# Patient Record
Sex: FEMALE | Race: WHITE | NOT HISPANIC OR LATINO | Employment: UNEMPLOYED | URBAN - METROPOLITAN AREA
[De-identification: names, ages, dates, MRNs, and addresses within clinical notes are randomized per-mention and may not be internally consistent; named-entity substitution may affect disease eponyms.]

---

## 2018-02-01 ENCOUNTER — HOSPITAL ENCOUNTER (OUTPATIENT)
Dept: OTHER | Facility: HOSPITAL | Age: 63
Setting detail: SPECIMEN
Discharge: HOME | End: 2018-02-01
Attending: THORACIC SURGERY (CARDIOTHORACIC VASCULAR SURGERY) | Admitting: THORACIC SURGERY (CARDIOTHORACIC VASCULAR SURGERY)

## 2019-06-21 ENCOUNTER — APPOINTMENT (OUTPATIENT)
Dept: GENERAL RADIOLOGY | Facility: HOSPITAL | Age: 64
End: 2019-06-21

## 2019-06-21 ENCOUNTER — HOSPITAL ENCOUNTER (EMERGENCY)
Facility: HOSPITAL | Age: 64
Discharge: HOME | End: 2019-06-21
Admitting: EMERGENCY MEDICINE

## 2019-06-21 VITALS
WEIGHT: 197 LBS | BODY MASS INDEX: 31.66 KG/M2 | HEIGHT: 66 IN | TEMPERATURE: 98.4 F | HEART RATE: 55 BPM | SYSTOLIC BLOOD PRESSURE: 187 MMHG | DIASTOLIC BLOOD PRESSURE: 81 MMHG | RESPIRATION RATE: 18 BRPM | OXYGEN SATURATION: 97 %

## 2019-06-21 DIAGNOSIS — S93.402A SPRAIN OF LEFT ANKLE, UNSPECIFIED LIGAMENT, INITIAL ENCOUNTER: ICD-10-CM

## 2019-06-21 DIAGNOSIS — S39.012A LUMBAR STRAIN, INITIAL ENCOUNTER: Primary | ICD-10-CM

## 2019-06-21 PROCEDURE — 72110 X-RAY EXAM L-2 SPINE 4/>VWS: CPT

## 2019-06-21 PROCEDURE — 73610 X-RAY EXAM OF ANKLE: CPT

## 2019-06-21 PROCEDURE — 25010000002 MORPHINE PER 10 MG: Performed by: EMERGENCY MEDICINE

## 2019-06-21 PROCEDURE — 99283 EMERGENCY DEPT VISIT LOW MDM: CPT

## 2019-06-21 PROCEDURE — 96372 THER/PROPH/DIAG INJ SC/IM: CPT

## 2019-06-21 RX ORDER — GABAPENTIN 800 MG/1
TABLET ORAL EVERY 8 HOURS SCHEDULED
COMMUNITY
End: 2021-09-14

## 2019-06-21 RX ORDER — POTASSIUM CHLORIDE 750 MG/1
TABLET, EXTENDED RELEASE ORAL
COMMUNITY
End: 2019-10-31 | Stop reason: ALTCHOICE

## 2019-06-21 RX ORDER — HYDRALAZINE HYDROCHLORIDE 100 MG/1
TABLET, FILM COATED ORAL EVERY 12 HOURS SCHEDULED
COMMUNITY

## 2019-06-21 RX ORDER — FUROSEMIDE 40 MG/1
TABLET ORAL
COMMUNITY
End: 2019-10-31 | Stop reason: ALTCHOICE

## 2019-06-21 RX ORDER — OXYCODONE HYDROCHLORIDE AND ACETAMINOPHEN 5; 325 MG/1; MG/1
TABLET ORAL EVERY 8 HOURS SCHEDULED
COMMUNITY
End: 2019-10-31 | Stop reason: ALTCHOICE

## 2019-06-21 RX ORDER — MORPHINE SULFATE 4 MG/ML
4 INJECTION, SOLUTION INTRAMUSCULAR; INTRAVENOUS ONCE
Status: COMPLETED | OUTPATIENT
Start: 2019-06-21 | End: 2019-06-21

## 2019-06-21 RX ORDER — OLANZAPINE 2.5 MG/1
TABLET ORAL
COMMUNITY

## 2019-06-21 RX ORDER — ALBUTEROL SULFATE 90 UG/1
AEROSOL, METERED RESPIRATORY (INHALATION)
COMMUNITY

## 2019-06-21 RX ORDER — PANTOPRAZOLE SODIUM 40 MG/1
TABLET, DELAYED RELEASE ORAL
COMMUNITY

## 2019-06-21 RX ORDER — HYDROCODONE BITARTRATE AND ACETAMINOPHEN 5; 325 MG/1; MG/1
1 TABLET ORAL EVERY 6 HOURS PRN
Qty: 10 TABLET | Refills: 0 | Status: SHIPPED | OUTPATIENT
Start: 2019-06-21 | End: 2019-10-31 | Stop reason: ALTCHOICE

## 2019-06-21 RX ORDER — DIPHENOXYLATE HYDROCHLORIDE AND ATROPINE SULFATE 2.5; .025 MG/1; MG/1
TABLET ORAL
COMMUNITY

## 2019-06-21 RX ORDER — SIMVASTATIN 20 MG
TABLET ORAL
COMMUNITY
Start: 2015-07-06

## 2019-06-21 RX ORDER — DICYCLOMINE HCL 20 MG
TABLET ORAL
COMMUNITY
Start: 2015-07-06 | End: 2019-10-31 | Stop reason: ALTCHOICE

## 2019-06-21 RX ORDER — ONDANSETRON 4 MG/1
4 TABLET, ORALLY DISINTEGRATING ORAL ONCE
Status: COMPLETED | OUTPATIENT
Start: 2019-06-21 | End: 2019-06-21

## 2019-06-21 RX ORDER — IBUPROFEN 800 MG/1
TABLET ORAL
COMMUNITY
End: 2021-08-02 | Stop reason: ALTCHOICE

## 2019-06-21 RX ORDER — BACLOFEN 10 MG/1
TABLET ORAL EVERY 8 HOURS SCHEDULED
COMMUNITY
End: 2019-10-31 | Stop reason: ALTCHOICE

## 2019-06-21 RX ORDER — ONDANSETRON HYDROCHLORIDE 8 MG/1
TABLET, FILM COATED ORAL
COMMUNITY
End: 2021-08-02 | Stop reason: ALTCHOICE

## 2019-06-21 RX ADMIN — MORPHINE SULFATE 4 MG: 4 INJECTION INTRAVENOUS at 11:13

## 2019-06-21 RX ADMIN — ONDANSETRON 4 MG: 4 TABLET, ORALLY DISINTEGRATING ORAL at 11:12

## 2019-10-31 ENCOUNTER — OFFICE VISIT (OUTPATIENT)
Dept: PAIN MEDICINE | Facility: CLINIC | Age: 64
End: 2019-10-31

## 2019-10-31 VITALS
OXYGEN SATURATION: 99 % | HEIGHT: 66 IN | WEIGHT: 190 LBS | BODY MASS INDEX: 30.53 KG/M2 | SYSTOLIC BLOOD PRESSURE: 132 MMHG | HEART RATE: 52 BPM | RESPIRATION RATE: 16 BRPM | TEMPERATURE: 97.6 F | DIASTOLIC BLOOD PRESSURE: 81 MMHG

## 2019-10-31 DIAGNOSIS — Z79.899 HIGH RISK MEDICATION USE: ICD-10-CM

## 2019-10-31 DIAGNOSIS — M47.812 CERVICAL SPONDYLOSIS WITHOUT MYELOPATHY: ICD-10-CM

## 2019-10-31 DIAGNOSIS — G89.4 CHRONIC PAIN SYNDROME: Primary | ICD-10-CM

## 2019-10-31 DIAGNOSIS — M47.817 LUMBOSACRAL SPONDYLOSIS WITHOUT MYELOPATHY: ICD-10-CM

## 2019-10-31 DIAGNOSIS — M79.7 FIBROMYALGIA: ICD-10-CM

## 2019-10-31 PROCEDURE — G0463 HOSPITAL OUTPT CLINIC VISIT: HCPCS | Performed by: ANESTHESIOLOGY

## 2019-10-31 PROCEDURE — 99204 OFFICE O/P NEW MOD 45 MIN: CPT | Performed by: ANESTHESIOLOGY

## 2019-10-31 RX ORDER — AZELASTINE HYDROCHLORIDE 137 UG/1
SPRAY, METERED NASAL
COMMUNITY

## 2019-10-31 RX ORDER — VARENICLINE TARTRATE 1 MG/1
1 TABLET, FILM COATED ORAL 2 TIMES DAILY
COMMUNITY

## 2019-10-31 RX ORDER — FERROUS SULFATE 325(65) MG
325 TABLET ORAL
COMMUNITY

## 2019-10-31 RX ORDER — ALLOPURINOL 300 MG/1
300 TABLET ORAL DAILY
COMMUNITY

## 2019-10-31 RX ORDER — CLOPIDOGREL BISULFATE 75 MG/1
75 TABLET ORAL DAILY
COMMUNITY
End: 2021-08-02 | Stop reason: ALTCHOICE

## 2019-10-31 RX ORDER — AMLODIPINE BESYLATE 5 MG/1
5 TABLET ORAL DAILY
COMMUNITY

## 2019-10-31 RX ORDER — LANOLIN ALCOHOL/MO/W.PET/CERES
1000 CREAM (GRAM) TOPICAL DAILY
COMMUNITY
End: 2022-03-14

## 2019-10-31 RX ORDER — ERGOCALCIFEROL 1.25 MG/1
50000 CAPSULE ORAL WEEKLY
COMMUNITY
End: 2022-03-14

## 2019-10-31 NOTE — PROGRESS NOTES
Subjective    CC Neck pain,Back pain  Shavonne Falcon is a 64 y.o. female with chronic polyarthralgia, generalized myofascial pain from fibromyalgia, back pain neck pain from DDD spondylosis here for initial evaluation.  Referred by PCP.  complains of continued chronic generalized myofascial pain and polyarthralgia worse with any activity.  Chronic back pain neck pain without radicular pain.  On chronic opioid therapy with oxycodone, seen at outside pain clinic/.  Was dismissed Due to failed urine drug screen and not showing up for pill count.    C-spine CT 2018 no acute findings, multiple level spondylosis.      Pain Assessment   Location of Pain: Lower Back, R Hip, L Hip, L Leg, neck pain, joint  Description of Pain: Dull/Aching, Throbbing, Stabbing  Previous Pain Rating :  Current Pain Rating:   Aggravating Factors: Activity  Alleviating Factors: Rest, Medication    The following portions of the patient's history were reviewed and updated as appropriate: allergies, current medications, past family history, past medical history, past social history, past surgical history and problem list.      has a past medical history of Anxiety, Arthritis, Asthma, COPD (chronic obstructive pulmonary disease) (CMS/[facility]), Depression, Fibromyalgia, primary, GERD (gastroesophageal reflux disease), Hyperlipidemia, Hypertension, Hypertension, Nerve damage, Neuropathy, and Sleep apnea.     . has a past surgical history that includes Brain surgery; Hysterectomy; Tonsillectomy; Cholecystectomy; Colonoscopy; Esophagogastroduodenoscopy; and Lung surgery.    family history includes Alcohol abuse in her brother, daughter, father, and maternal grandmother; Cancer in her father and mother; Heart attack in her mother; Hypertension in her mother.    Social History     Tobacco Use   • Smoking status: Current Every Day Smoker     Packs/day: 1.00     Types: Cigarettes   • Smokeless tobacco: Never Used   • Tobacco comment: on chantix   Substance  Use Topics   • Alcohol use: No     Frequency: Never     Review of Systems   Constitutional: Negative for chills, fatigue and fever.   HENT: Negative for swollen glands and trouble swallowing.    Respiratory: Negative.  Negative for shortness of breath.    Cardiovascular: Negative for chest pain, palpitations and leg swelling.   Gastrointestinal: Negative for nausea and vomiting.   Musculoskeletal: Positive for arthralgias, back pain and myalgias. Negative for gait problem, joint swelling, neck pain and neck stiffness.   Skin: Negative for color change, dry skin, rash and skin lesions.   Neurological: Negative for dizziness, weakness, light-headedness and headache.   Hematological: Negative for adenopathy. Does not bruise/bleed easily.   Psychiatric/Behavioral: Negative for behavioral problems, suicidal ideas and depressed mood.   All other systems reviewed and are negative.      Objective   Physical Exam   Constitutional: She is oriented to person, place, and time. She appears well-developed and well-nourished. No distress.   Eyes: Conjunctivae are normal. Pupils are equal, round, and reactive to light.   Neck: Normal range of motion and full passive range of motion without pain. No Kernig's sign noted.   Cardiovascular: Normal heart sounds.   Pulmonary/Chest: Effort normal and breath sounds normal.   Musculoskeletal:        Lumbar back: She exhibits decreased range of motion, tenderness and pain.     Vascular Status -  Her right foot exhibits no edema. Her left foot exhibits no edema.  Lymphadenopathy:     She has no cervical adenopathy.   Neurological: She is alert and oriented to person, place, and time. She has normal strength and normal reflexes. No sensory deficit. Coordination normal.   Skin: Skin is warm and dry. Capillary refill takes less than 2 seconds.   Psychiatric: She has a normal mood and affect. Her behavior is normal.   Vitals reviewed.    /81   Pulse 52   Temp 97.6 °F (36.4 °C)   Resp 16   " Ht 167.6 cm (66\")   Wt 86.2 kg (190 lb)   SpO2 99%   BMI 30.67 kg/m²      Global pain scale and PHQ 9 on chart  Opioid risk tool low risk    Assessment/Plan   Shavonne was seen today for back pain and initial evaluation.    Diagnoses and all orders for this visit:    Chronic pain syndrome    Lumbosacral spondylosis without myelopathy    Cervical spondylosis without myelopathy    Fibromyalgia    High risk medication use    Summary  Shavonne Falcon is a 64 y.o. female with chronic polyarthralgia, generalized myofascial pain from fibromyalgia, back pain neck pain from DDD spondylosis here for initial evaluation.  Referred by PCP.  Chronic pain from lumbar and cervical DDD spondylosis, fibromyalgia and polyarthralgia.  On chronic opioid therapy for several years.  Was seen in pain management with Dr. ALCANTAR.  Has been dismissed for failed UDS and not showing up for pill counts.    When asked to sign a release for record from Dr. ALCANTAR patient declines and became upset as I stressed the importance of controlled substance/ chronic opioid therapy agreement/policies and the need to review old records prior to determining whether she can continue opioid therapy.      Spent over 10-minute in face-to-face discussion with patient regarding the above.    No further follow up.   F/U with PCP.                  "

## 2020-07-01 ENCOUNTER — APPOINTMENT (OUTPATIENT)
Dept: WOUND CARE | Facility: HOSPITAL | Age: 65
End: 2020-07-01

## 2020-07-08 ENCOUNTER — APPOINTMENT (OUTPATIENT)
Dept: WOUND CARE | Facility: HOSPITAL | Age: 65
End: 2020-07-08

## 2020-07-15 ENCOUNTER — OFFICE VISIT (OUTPATIENT)
Dept: WOUND CARE | Facility: HOSPITAL | Age: 65
End: 2020-07-15

## 2020-07-15 DIAGNOSIS — G90.09 OTHER IDIOPATHIC PERIPHERAL AUTONOMIC NEUROPATHY: ICD-10-CM

## 2020-07-15 PROCEDURE — 99203 OFFICE O/P NEW LOW 30 MIN: CPT | Performed by: PODIATRIST

## 2020-07-15 PROCEDURE — G0463 HOSPITAL OUTPT CLINIC VISIT: HCPCS

## 2020-11-25 ENCOUNTER — INPATIENT HOSPITAL (INPATIENT)
Dept: URBAN - METROPOLITAN AREA HOSPITAL 76 | Facility: HOSPITAL | Age: 65
End: 2020-11-25
Payer: MEDICARE

## 2020-11-25 DIAGNOSIS — A04.72 ENTEROCOLITIS DUE TO CLOSTRIDIUM DIFFICILE, NOT SPECIFIED AS: ICD-10-CM

## 2020-11-25 DIAGNOSIS — L03.90 CELLULITIS, UNSPECIFIED: ICD-10-CM

## 2020-11-25 DIAGNOSIS — K52.9 NONINFECTIVE GASTROENTERITIS AND COLITIS, UNSPECIFIED: ICD-10-CM

## 2020-11-25 PROCEDURE — 99222 1ST HOSP IP/OBS MODERATE 55: CPT | Performed by: INTERNAL MEDICINE

## 2020-11-26 ENCOUNTER — INPATIENT HOSPITAL (INPATIENT)
Dept: URBAN - METROPOLITAN AREA HOSPITAL 76 | Facility: HOSPITAL | Age: 65
End: 2020-11-26
Payer: MEDICARE

## 2020-11-26 DIAGNOSIS — R65.21 SEVERE SEPSIS WITH SEPTIC SHOCK: ICD-10-CM

## 2020-11-26 DIAGNOSIS — L03.90 CELLULITIS, UNSPECIFIED: ICD-10-CM

## 2020-11-26 DIAGNOSIS — A04.72 ENTEROCOLITIS DUE TO CLOSTRIDIUM DIFFICILE, NOT SPECIFIED AS: ICD-10-CM

## 2020-11-26 DIAGNOSIS — Z90.5 ACQUIRED ABSENCE OF KIDNEY: ICD-10-CM

## 2020-11-26 PROCEDURE — 99232 SBSQ HOSP IP/OBS MODERATE 35: CPT | Performed by: INTERNAL MEDICINE

## 2020-11-27 PROCEDURE — 99232 SBSQ HOSP IP/OBS MODERATE 35: CPT | Performed by: INTERNAL MEDICINE

## 2021-08-02 ENCOUNTER — OFFICE VISIT (OUTPATIENT)
Dept: PAIN MEDICINE | Facility: CLINIC | Age: 66
End: 2021-08-02

## 2021-08-02 ENCOUNTER — TELEPHONE (OUTPATIENT)
Dept: PAIN MEDICINE | Facility: CLINIC | Age: 66
End: 2021-08-02

## 2021-08-02 VITALS — BODY MASS INDEX: 30.53 KG/M2 | WEIGHT: 190 LBS | HEIGHT: 66 IN | RESPIRATION RATE: 16 BRPM

## 2021-08-02 DIAGNOSIS — Z79.899 HIGH RISK MEDICATION USE: Primary | ICD-10-CM

## 2021-08-02 DIAGNOSIS — M47.817 LUMBOSACRAL SPONDYLOSIS WITHOUT MYELOPATHY: ICD-10-CM

## 2021-08-02 DIAGNOSIS — M47.812 CERVICAL SPONDYLOSIS WITHOUT MYELOPATHY: ICD-10-CM

## 2021-08-02 DIAGNOSIS — Z79.899 HIGH RISK MEDICATION USE: ICD-10-CM

## 2021-08-02 DIAGNOSIS — G89.4 CHRONIC PAIN SYNDROME: Primary | ICD-10-CM

## 2021-08-02 DIAGNOSIS — M79.7 FIBROMYALGIA: ICD-10-CM

## 2021-08-02 PROCEDURE — 99214 OFFICE O/P EST MOD 30 MIN: CPT | Performed by: ANESTHESIOLOGY

## 2021-08-02 RX ORDER — ONDANSETRON 8 MG/1
TABLET, ORALLY DISINTEGRATING ORAL
COMMUNITY

## 2021-08-02 RX ORDER — OXYCODONE AND ACETAMINOPHEN 7.5; 325 MG/1; MG/1
1 TABLET ORAL 2 TIMES DAILY PRN
Qty: 14 TABLET | Refills: 0 | Status: SHIPPED | OUTPATIENT
Start: 2021-08-02 | End: 2021-09-13 | Stop reason: SDUPTHER

## 2021-08-02 RX ORDER — LEVETIRACETAM 1000 MG/1
TABLET ORAL
COMMUNITY
Start: 2021-07-13

## 2021-08-02 RX ORDER — TICAGRELOR 60 MG/1
TABLET ORAL
COMMUNITY
Start: 2021-07-13

## 2021-08-02 RX ORDER — APIXABAN 5 MG/1
TABLET, FILM COATED ORAL
COMMUNITY
Start: 2021-07-13

## 2021-08-02 RX ORDER — METOPROLOL SUCCINATE 50 MG/1
TABLET, EXTENDED RELEASE ORAL
COMMUNITY
Start: 2021-03-04

## 2021-08-02 RX ORDER — IPRATROPIUM BROMIDE AND ALBUTEROL SULFATE 2.5; .5 MG/3ML; MG/3ML
SOLUTION RESPIRATORY (INHALATION)
COMMUNITY
Start: 2021-07-01

## 2021-08-02 RX ORDER — MONTELUKAST SODIUM 10 MG/1
TABLET ORAL
COMMUNITY
Start: 2021-07-13

## 2021-08-02 RX ORDER — ATORVASTATIN CALCIUM 40 MG/1
TABLET, FILM COATED ORAL
COMMUNITY
Start: 2021-07-13 | End: 2021-08-02 | Stop reason: ALTCHOICE

## 2021-08-02 RX ORDER — GLYCOPYRROLATE AND FORMOTEROL FUMARATE 9; 4.8 UG/1; UG/1
AEROSOL, METERED RESPIRATORY (INHALATION)
COMMUNITY
Start: 2021-07-24

## 2021-08-02 RX ORDER — FOLIC ACID 1 MG/1
TABLET ORAL
COMMUNITY
Start: 2021-07-13

## 2021-08-02 NOTE — TELEPHONE ENCOUNTER
Caller: MANDIE TILLEY    Relationship: SELF    Best call back number: [phone]    Medication needed:NOT CERTAIN WHAT TYPE OF PAIN MEDICATION DR. OLIVER IS PRESCRIBING FOR PATIENT- PATIENT STATES IT WAS TO BE A WEEKS WORTH  Requested Prescriptions      No prescriptions requested or ordered in this encounter       When do you need the refill by: 08/02/2021    What additional details did the patient provide when requesting the medication: PATIENT IS NOT ABLE TO DRIVE- HAS LIMITED ASSISTANCE- HER PHARMACY CLOSES AT 6:00    Does the patient have less than a 3 day supply:  [x] Yes  [] No    What is the patient's preferred pharmacy:[facility]: [id number]: [phone]

## 2021-08-03 RX ORDER — OXYCODONE AND ACETAMINOPHEN 7.5; 325 MG/1; MG/1
1 TABLET ORAL 2 TIMES DAILY PRN
Qty: 46 TABLET | Refills: 0 | Status: SHIPPED | OUTPATIENT
Start: 2021-08-09 | End: 2021-09-13 | Stop reason: SDUPTHER

## 2021-08-03 NOTE — PROGRESS NOTES
Subjective    CC Neck pain,Back pain  Shavonne Falcon is a 66 y.o. female with chronic polyarthralgia, generalized myofascial pain from fibromyalgia, back pain neck pain from DDD spondylosis here for f/u.   Last seen 2 years ago.  In the interim was seen at [place] pain management continued opioid therapy of oxycodone.  Continued chronic generalized myofascial pain and polyarthralgia worse with any activity.  Chronic back pain neck pain without radicular pain.  On chronic opioid therapy with oxycodone, seen at outside pain clinic/.  Was dismissed Due to failed urine drug screen and not showing up for pill count.    C-spine CT 2018 no acute findings, multiple level spondylosis.      Pain Assessment   Location of Pain: Lower Back, legs, neck pain, joint  Description of Pain: Dull/Aching, Throbbing, Stabbing  Previous Pain Rating :8  Current Pain Ratin  Aggravating Factors: Activity  Alleviating Factors: Rest, Medication    The following portions of the patient's history were reviewed and updated as appropriate: allergies, current medications, past family history, past medical history, past social history, past surgical history and problem list.      has a past medical history of Anxiety, Arthritis, Asthma, COPD (chronic obstructive pulmonary disease) (CMS/HCC), Depression, Fibromyalgia, primary, GERD (gastroesophageal reflux disease), Hyperlipidemia, Hypertension, Hypertension, Low back pain, Nerve damage, Neuropathy, and Sleep apnea.     . has a past surgical history that includes Brain surgery; Hysterectomy; Tonsillectomy; Cholecystectomy; Colonoscopy; Esophagogastroduodenoscopy; and Lung surgery.    family history includes Alcohol abuse in her brother, daughter, father, and maternal grandmother; Cancer in her father and mother; Heart attack in her mother; Hypertension in her mother.    Social History     Tobacco Use   • Smoking status: Current Every Day Smoker     Packs/day: 1.00     Types: Cigarettes   •  "Smokeless tobacco: Never Used   • Tobacco comment: on chantix   Substance Use Topics   • Alcohol use: No     Review of Systems   Musculoskeletal: Positive for arthralgias, back pain, gait problem, myalgias, neck pain and neck stiffness.   All other systems reviewed and are negative.      Objective   Physical Exam   Constitutional: No distress.   cane   Pulmonary/Chest: Effort normal.   Musculoskeletal:      Cervical back: She exhibits decreased range of motion and tenderness.      Lumbar back: She exhibits decreased range of motion and tenderness.   Vitals reviewed.    Resp 16   Ht 167.6 cm (66\")   Wt 86.2 kg (190 lb)   BMI 30.67 kg/m²     PHQ 9 on chart  Opioid risk tool low risk    Assessment/Plan   Diagnoses and all orders for this visit:    1. Chronic pain syndrome (Primary)  -     oxyCODONE-acetaminophen (PERCOCET) 7.5-325 MG per tablet; Take 1 tablet by mouth 2 (Two) Times a Day As Needed for Severe Pain .  Dispense: 14 tablet; Refill: 0  -     oxyCODONE-acetaminophen (PERCOCET) 7.5-325 MG per tablet; Take 1 tablet by mouth 2 (Two) Times a Day As Needed for Severe Pain . DNF before 8/9/2021  Dispense: 46 tablet; Refill: 0    2. Lumbosacral spondylosis without myelopathy    3. Cervical spondylosis without myelopathy    4. Fibromyalgia    5. High risk medication use    Summary  Shavonne Falcon is a 64 y.o. female with chronic polyarthralgia, generalized myofascial pain from fibromyalgia, back pain neck pain from DDD spondylosis here for follow-up.  Chronic pain from lumbar and cervical DDD spondylosis, fibromyalgia and polyarthralgia.  History of chronic polyneuropathy/craniotomy.    Last seen 2 years ago.  In the interim was seen at [place] pain management continued opioid therapy of oxycodone.  Past history of noncompliance.  Discussed risk of tolerance, dependence, respiratory depression, coma and death associated with use of oral opioids for treatment of chronic nonmalignant pain.   Discussed controlled " substance prescribing policy and compliance.    We will continue opioid therapy with oxycodone 7.5/325 twice daily as needed for severe pain.  UDS sent.  Inspect reviewed  Point-of-care UDS clear.     RTC 1 month

## 2021-09-13 ENCOUNTER — OFFICE VISIT (OUTPATIENT)
Dept: PAIN MEDICINE | Facility: CLINIC | Age: 66
End: 2021-09-13

## 2021-09-13 VITALS
DIASTOLIC BLOOD PRESSURE: 92 MMHG | RESPIRATION RATE: 16 BRPM | HEART RATE: 76 BPM | SYSTOLIC BLOOD PRESSURE: 172 MMHG | OXYGEN SATURATION: 98 % | HEIGHT: 66 IN | BODY MASS INDEX: 30.53 KG/M2 | WEIGHT: 190 LBS

## 2021-09-13 DIAGNOSIS — Z79.899 HIGH RISK MEDICATION USE: ICD-10-CM

## 2021-09-13 DIAGNOSIS — G89.4 CHRONIC PAIN SYNDROME: Primary | ICD-10-CM

## 2021-09-13 DIAGNOSIS — M47.817 LUMBOSACRAL SPONDYLOSIS WITHOUT MYELOPATHY: ICD-10-CM

## 2021-09-13 DIAGNOSIS — Z79.899 HIGH RISK MEDICATION USE: Primary | ICD-10-CM

## 2021-09-13 DIAGNOSIS — M79.7 FIBROMYALGIA: ICD-10-CM

## 2021-09-13 DIAGNOSIS — M47.812 CERVICAL SPONDYLOSIS WITHOUT MYELOPATHY: ICD-10-CM

## 2021-09-13 DIAGNOSIS — M79.671 ACUTE PAIN OF RIGHT FOOT: ICD-10-CM

## 2021-09-13 PROCEDURE — 99214 OFFICE O/P EST MOD 30 MIN: CPT | Performed by: ANESTHESIOLOGY

## 2021-09-13 RX ORDER — ONDANSETRON HYDROCHLORIDE 8 MG/1
TABLET, FILM COATED ORAL
COMMUNITY
Start: 2021-08-31

## 2021-09-13 RX ORDER — LEVOTHYROXINE SODIUM 0.05 MG/1
TABLET ORAL
COMMUNITY
Start: 2021-09-01

## 2021-09-13 RX ORDER — OXYCODONE AND ACETAMINOPHEN 7.5; 325 MG/1; MG/1
1 TABLET ORAL 2 TIMES DAILY PRN
Qty: 60 TABLET | Refills: 0 | Status: SHIPPED | OUTPATIENT
Start: 2021-10-13 | End: 2021-11-15 | Stop reason: SDUPTHER

## 2021-09-13 RX ORDER — METRONIDAZOLE 500 MG/1
TABLET ORAL
COMMUNITY
Start: 2021-09-03

## 2021-09-13 RX ORDER — ATORVASTATIN CALCIUM 40 MG/1
TABLET, FILM COATED ORAL
COMMUNITY
Start: 2021-09-01

## 2021-09-13 RX ORDER — OXYCODONE AND ACETAMINOPHEN 7.5; 325 MG/1; MG/1
1 TABLET ORAL 2 TIMES DAILY PRN
Qty: 60 TABLET | Refills: 0 | Status: SHIPPED | OUTPATIENT
Start: 2021-09-13 | End: 2021-11-15 | Stop reason: SDUPTHER

## 2021-09-13 NOTE — PROGRESS NOTES
Subjective    CC Neck pain,Back pain  Shavonne Falcon is a 66 y.o. female with chronic polyarthralgia, generalized myofascial pain from fibromyalgia, back pain neck pain from DDD spondylosis here for f/u.   Restarted/continued oxycodone last visit.  This then had a fall twisted ankle and broken right great toe.  Complains of acute right foot pain was seen in ED.  Has not followed up with PCP.  Cchronic generalized myofascial pain and polyarthralgia worse with any activity.  Chronic back pain neck pain without radicular pain.  On chronic opioid therapy with oxycodone, seen at outside pain clinic/.  Was dismissed Due to failed urine drug screen and not showing up for pill count.    C-spine CT 2018 no acute findings, multiple level spondylosis.      Pain Assessment   Location of Pain: Lower Back, legs, neck pain, joint  Description of Pain: Dull/Aching, Throbbing, Stabbing  Previous Pain Rating :8  Current Pain Ratin  Aggravating Factors: Activity  Alleviating Factors: Rest, Medication    The following portions of the patient's history were reviewed and updated as appropriate: allergies, current medications, past family history, past medical history, past social history, past surgical history and problem list.      has a past medical history of Anxiety, Arthritis, Asthma, COPD (chronic obstructive pulmonary disease) (CMS/[facility]), Depression, Fibromyalgia, primary, GERD (gastroesophageal reflux disease), Hyperlipidemia, Hypertension, Hypertension, Low back pain, Nerve damage, Neuropathy, and Sleep apnea.     . has a past surgical history that includes Brain surgery; Hysterectomy; Tonsillectomy; Cholecystectomy; Colonoscopy; Esophagogastroduodenoscopy; and Lung surgery.    family history includes Alcohol abuse in her brother, daughter, father, and maternal grandmother; Cancer in her father and mother; Heart attack in her mother; Hypertension in her mother.    Social History     Tobacco Use   • Smoking status: Current  "Every Day Smoker     Packs/day: 1.00     Types: Cigarettes   • Smokeless tobacco: Never Used   • Tobacco comment: on chantix   Substance Use Topics   • Alcohol use: No     Review of Systems   Musculoskeletal: Positive for arthralgias, back pain, gait problem, myalgias, neck pain and neck stiffness.   All other systems reviewed and are negative.    Objective   Physical Exam   Constitutional: No distress.   cane   Pulmonary/Chest: Effort normal.   Musculoskeletal:      Cervical back: She exhibits decreased range of motion and tenderness.      Lumbar back: She exhibits decreased range of motion and tenderness.   Vitals reviewed.    /92   Pulse 76   Resp 16   Ht 167.6 cm (65.98\")   Wt 86.2 kg (190 lb)   SpO2 98%   BMI 30.68 kg/m²     PHQ 9 on chart  Opioid risk tool low risk    Assessment/Plan   Diagnoses and all orders for this visit:    1. Chronic pain syndrome (Primary)  -     oxyCODONE-acetaminophen (PERCOCET) 7.5-325 MG per tablet; Take 1 tablet by mouth 2 (Two) Times a Day As Needed for Severe Pain . DNF before 10/13/2021  Dispense: 60 tablet; Refill: 0  -     oxyCODONE-acetaminophen (PERCOCET) 7.5-325 MG per tablet; Take 1 tablet by mouth 2 (Two) Times a Day As Needed for Severe Pain .  Dispense: 60 tablet; Refill: 0    2. Lumbosacral spondylosis without myelopathy    3. Cervical spondylosis without myelopathy    4. Fibromyalgia    5. Acute pain of right foot    6. High risk medication use    Summary  Shavonne Falcon is a 66 y.o. female with chronic polyarthralgia, generalized myofascial pain from fibromyalgia, back pain neck pain from DDD spondylosis here for follow-up.  Chronic pain from lumbar and cervical DDD spondylosis, fibromyalgia and polyarthralgia.  History of chronic polyneuropathy/craniotomy.    Restarted/continued oxycodone last visit.  This then had a fall twisted ankle and broken right great toe.  Complains of acute right foot pain was seen in ED. given cam boot.  Encouraged to follow-up " with PCP for further plan/referral to Ortho.     Continue oxycodone 7.5/325 twice daily as needed for severe pain.  UDS and inspect reviewed.  Discussed risk of tolerance, dependence, respiratory depression, coma and death associated with use of oral opioids for treatment of chronic nonmalignant pain.     RTC 2 month

## 2021-09-14 RX ORDER — GABAPENTIN 800 MG/1
TABLET ORAL
Qty: 90 TABLET | Refills: 11 | Status: SHIPPED | OUTPATIENT
Start: 2021-09-14 | End: 2021-11-15 | Stop reason: SDUPTHER

## 2021-10-06 ENCOUNTER — TELEPHONE (OUTPATIENT)
Dept: PAIN MEDICINE | Facility: CLINIC | Age: 66
End: 2021-10-06

## 2021-10-06 NOTE — TELEPHONE ENCOUNTER
Caller: MANDIE TILLEY    Relationship: SELF      Medication requested (name and dosage): oxyCODONE-acetaminophen (PERCOCET) 7.5-325        Pharmacy where request should be sent: [facility] ( [facility])    Best call back number: [phone]    Does the patient have less than a 3 day supply:  [] Yes  [x] No        PATIENT IS TRYING TO GET A COUPLE MORE MEDS TO GET HER TO HER SURGERY ON Monday 10/11/21      Lg Avila Rep   10/06/21 14:13 EDT

## 2021-10-07 NOTE — TELEPHONE ENCOUNTER
If she is having surgery surgery and will prescribe opioid postop on the 11th, if indicated.  In the meantime she can take 1 extra pill if needed for the next 2-3 days until the surgery on the 11th.  She was supposed to refill on the 10/13th so she 4 extra pills surgery.

## 2021-10-07 NOTE — TELEPHONE ENCOUNTER
She is having increase pain in her foot possibly having an amputation next week, wants to know if she can have an increase in her pain medication possibly 3 a day?

## 2021-10-07 NOTE — TELEPHONE ENCOUNTER
Caller: MANDIE TILLEY    Relationship to patient: PATIENT     Best call back number: [phone]    Patient is needing: PATIENT RETURNED CALL. ATTEMPTED TO WARM TRANSFER. SHE HAD DIFFICULTY REACHING THE PHONE IN TIME. SHE IS ANXIOUS FOR SOMEONE TO CALL HER BACK.

## 2021-10-20 ENCOUNTER — TELEPHONE (OUTPATIENT)
Dept: PAIN MEDICINE | Facility: CLINIC | Age: 66
End: 2021-10-20

## 2021-10-20 NOTE — TELEPHONE ENCOUNTER
PT'S SURGERY GOT CANCELLED DUE TO THE SURGEONS SCHEDULE. PT HAS AN APPT WITH ANOTHER DOCTOR CONCERNING HER FOOT BUT CANT REMEMBER WHEN THAT IS. PT IS REQUESTING AN INCREASE IN PAIN MEDICATION DUE TO THE PAIN FROM THE FOOT INFECTION AND WILL NEED A REFILL

## 2021-10-20 NOTE — TELEPHONE ENCOUNTER
I am confused. She filled 10/13/21 for #60. If she increased to TID as instructed by Dr. Walton then she should have enough to last for 20 days. How many pills does she have remaining?

## 2021-10-20 NOTE — TELEPHONE ENCOUNTER
Caller: Shavonne Falcon I    Relationship to patient: Self    Best call back number: [phone]    Patient is needing: PATIENT IS ASKING IF WE COULD REFER HER TO A PCP. PLEASE ADVISE. THANK YOU.

## 2021-11-09 ENCOUNTER — TELEPHONE (OUTPATIENT)
Dept: PAIN MEDICINE | Facility: CLINIC | Age: 66
End: 2021-11-09

## 2021-11-09 DIAGNOSIS — G89.4 CHRONIC PAIN SYNDROME: ICD-10-CM

## 2021-11-09 RX ORDER — OXYCODONE AND ACETAMINOPHEN 7.5; 325 MG/1; MG/1
1 TABLET ORAL 2 TIMES DAILY PRN
Qty: 60 TABLET | Refills: 0 | Status: CANCELLED | OUTPATIENT
Start: 2021-11-09

## 2021-11-09 NOTE — TELEPHONE ENCOUNTER
Caller: MANDIE TILLEY    Relationship: Self    Best call back number:[phone]    Requested Prescriptions: PERCOCET  Requested Prescriptions      No prescriptions requested or ordered in this encounter        Pharmacy where request should be sent: [facility]    Additional details provided by patient:PATIENT STATES SHE ONLY HAS A FEW LEFT AND SHE DOES NOT HAVE AN APPT UNTIL 11/15.  SHE HAS A FX HEEL AND IS IN PAIN  Does the patient have less than a 3 day supply:  [x] Yes  [] No    Lg Sims Rep   11/09/21 10:23 EST     UNABLE TO WARM TRANSFER

## 2021-11-15 ENCOUNTER — OFFICE VISIT (OUTPATIENT)
Dept: PAIN MEDICINE | Facility: CLINIC | Age: 66
End: 2021-11-15

## 2021-11-15 VITALS
BODY MASS INDEX: 28.14 KG/M2 | DIASTOLIC BLOOD PRESSURE: 85 MMHG | WEIGHT: 190 LBS | HEART RATE: 69 BPM | SYSTOLIC BLOOD PRESSURE: 195 MMHG | OXYGEN SATURATION: 99 % | HEIGHT: 69 IN | RESPIRATION RATE: 16 BRPM

## 2021-11-15 DIAGNOSIS — G89.4 CHRONIC PAIN SYNDROME: Primary | ICD-10-CM

## 2021-11-15 DIAGNOSIS — M47.817 LUMBOSACRAL SPONDYLOSIS WITHOUT MYELOPATHY: ICD-10-CM

## 2021-11-15 DIAGNOSIS — M79.671 ACUTE PAIN OF RIGHT FOOT: ICD-10-CM

## 2021-11-15 DIAGNOSIS — M79.7 FIBROMYALGIA: ICD-10-CM

## 2021-11-15 DIAGNOSIS — Z79.899 HIGH RISK MEDICATION USE: ICD-10-CM

## 2021-11-15 DIAGNOSIS — M47.812 CERVICAL SPONDYLOSIS WITHOUT MYELOPATHY: ICD-10-CM

## 2021-11-15 PROCEDURE — 99214 OFFICE O/P EST MOD 30 MIN: CPT | Performed by: ANESTHESIOLOGY

## 2021-11-15 RX ORDER — GABAPENTIN 800 MG/1
800 TABLET ORAL 3 TIMES DAILY
Qty: 90 TABLET | Refills: 11 | Status: SHIPPED | OUTPATIENT
Start: 2021-11-15

## 2021-11-15 RX ORDER — OXYCODONE AND ACETAMINOPHEN 7.5; 325 MG/1; MG/1
1 TABLET ORAL 2 TIMES DAILY PRN
Qty: 60 TABLET | Refills: 0 | Status: SHIPPED | OUTPATIENT
Start: 2021-12-15 | End: 2022-01-13 | Stop reason: SDUPTHER

## 2021-11-15 RX ORDER — METOPROLOL TARTRATE 50 MG/1
TABLET, FILM COATED ORAL
COMMUNITY
Start: 2021-11-13 | End: 2021-11-15 | Stop reason: ALTCHOICE

## 2021-11-15 RX ORDER — IBUPROFEN 800 MG/1
TABLET ORAL
COMMUNITY
Start: 2021-10-13

## 2021-11-15 RX ORDER — OXYCODONE AND ACETAMINOPHEN 7.5; 325 MG/1; MG/1
1 TABLET ORAL 2 TIMES DAILY PRN
Qty: 60 TABLET | Refills: 0 | Status: SHIPPED | OUTPATIENT
Start: 2021-11-15 | End: 2022-01-13 | Stop reason: SDUPTHER

## 2021-11-15 NOTE — PROGRESS NOTES
Subjective    CC Neck pain,Back pain  Shavonne Falcon is a 66 y.o. female with chronic polyarthralgia, generalized myofascial pain from fibromyalgia, back pain neck pain from DDD spondylosis here for f/u.   Continued right foot pain, has 2 ulcers.  Was seen at [facility] podiatry and sent to wound care but could not follow-up due to distance.  She has been referred to podiatrist in [place] but has not followed up yet.  Continued chronic generalized myofascial pain and polyarthralgia worse with any activity.  Chronic back pain neck pain without radicular pain.  On chronic opioid therapy with oxycodone, seen at outside pain clinic/.  Was dismissed Due to failed urine drug screen and not showing up for pill count.    C-spine CT  no acute findings, multiple level spondylosis.      Pain Assessment   Location of Pain: Lower Back, legs, neck pain, joint  Description of Pain: Dull/Aching, Throbbing, Stabbing  Previous Pain Rating :8  Current Pain Ratin  Aggravating Factors: Activity  Alleviating Factors: Rest, Medication    The following portions of the patient's history were reviewed and updated as appropriate: allergies, current medications, past family history, past medical history, past social history, past surgical history and problem list.      has a past medical history of Anxiety, Arthritis, Asthma, COPD (chronic obstructive pulmonary disease) (HCC), Depression, Fibromyalgia, primary, GERD (gastroesophageal reflux disease), Hyperlipidemia, Hypertension, Hypertension, Low back pain, Nerve damage, Neuropathy, and Sleep apnea.     . has a past surgical history that includes Brain surgery; Hysterectomy; Tonsillectomy; Cholecystectomy; Colonoscopy; Esophagogastroduodenoscopy; and Lung surgery.    family history includes Alcohol abuse in her brother, daughter, father, and maternal grandmother; Cancer in her father and mother; Heart attack in her mother; Hypertension in her mother.    Social History     Tobacco  "Use   • Smoking status: Current Every Day Smoker     Packs/day: 1.00     Types: Cigarettes   • Smokeless tobacco: Never Used   • Tobacco comment: on chantix   Substance Use Topics   • Alcohol use: No     Review of Systems   Musculoskeletal: Positive for arthralgias, back pain, gait problem, myalgias, neck pain and neck stiffness.   All other systems reviewed and are negative.    Objective   Physical Exam   Constitutional: No distress.   cane   Pulmonary/Chest: Effort normal.   Musculoskeletal:      Cervical back: She exhibits decreased range of motion and tenderness.      Lumbar back: She exhibits decreased range of motion and tenderness.   Vitals reviewed.    BP (!) 195/85   Pulse 69   Resp 16   Ht 175.3 cm (69\")   Wt 86.2 kg (190 lb)   SpO2 99%   BMI 28.06 kg/m²     PHQ 9 on chart  Opioid risk tool low risk    Assessment/Plan   Diagnoses and all orders for this visit:    1. Chronic pain syndrome (Primary)  -     oxyCODONE-acetaminophen (PERCOCET) 7.5-325 MG per tablet; Take 1 tablet by mouth 2 (Two) Times a Day As Needed for Severe Pain .  Dispense: 60 tablet; Refill: 0  -     oxyCODONE-acetaminophen (PERCOCET) 7.5-325 MG per tablet; Take 1 tablet by mouth 2 (Two) Times a Day As Needed for Severe Pain . DNF before 11/15/2021  Dispense: 60 tablet; Refill: 0  -     gabapentin (NEURONTIN) 800 MG tablet; Take 1 tablet by mouth 3 (Three) Times a Day.  Dispense: 90 tablet; Refill: 11    2. Lumbosacral spondylosis without myelopathy    3. Cervical spondylosis without myelopathy    4. Fibromyalgia    5. Acute pain of right foot    6. High risk medication use    Summary  Shavonne Falcon is a 66 y.o. female with chronic polyarthralgia, generalized myofascial pain from fibromyalgia, back pain neck pain from DDD spondylosis here for follow-up.    Chronic pain from lumbar and cervical DDD spondylosis, fibromyalgia and polyarthralgia.  History of chronic polyneuropathy/craniotomy.    Continued right foot pain, has 2 ulcers. "  Was seen at [facility] podiatry and sent to wound care but could not follow-up due to distance.  She has been referred to podiatrist in [place] but has not followed up yet     Continue oxycodone 7.5/325 twice daily as needed for severe pain.  UDS and inspect reviewed.  Discussed risk of tolerance, dependence, respiratory depression, coma and death associated with use of oral opioids for treatment of chronic nonmalignant pain.     RTC 2 month

## 2022-01-13 ENCOUNTER — TELEPHONE (OUTPATIENT)
Dept: PAIN MEDICINE | Facility: CLINIC | Age: 67
End: 2022-01-13

## 2022-01-13 ENCOUNTER — OFFICE VISIT (OUTPATIENT)
Dept: PAIN MEDICINE | Facility: CLINIC | Age: 67
End: 2022-01-13

## 2022-01-13 VITALS
WEIGHT: 178 LBS | HEIGHT: 69 IN | SYSTOLIC BLOOD PRESSURE: 157 MMHG | DIASTOLIC BLOOD PRESSURE: 72 MMHG | BODY MASS INDEX: 26.36 KG/M2 | RESPIRATION RATE: 16 BRPM | HEART RATE: 53 BPM | OXYGEN SATURATION: 99 %

## 2022-01-13 DIAGNOSIS — Z79.899 HIGH RISK MEDICATION USE: Primary | ICD-10-CM

## 2022-01-13 DIAGNOSIS — G89.4 CHRONIC PAIN SYNDROME: ICD-10-CM

## 2022-01-13 DIAGNOSIS — M47.812 CERVICAL SPONDYLOSIS WITHOUT MYELOPATHY: ICD-10-CM

## 2022-01-13 DIAGNOSIS — M47.817 LUMBOSACRAL SPONDYLOSIS WITHOUT MYELOPATHY: ICD-10-CM

## 2022-01-13 DIAGNOSIS — M79.671 ACUTE PAIN OF RIGHT FOOT: ICD-10-CM

## 2022-01-13 DIAGNOSIS — M79.7 FIBROMYALGIA: ICD-10-CM

## 2022-01-13 PROCEDURE — 99214 OFFICE O/P EST MOD 30 MIN: CPT | Performed by: ANESTHESIOLOGY

## 2022-01-13 RX ORDER — METOPROLOL TARTRATE 50 MG/1
TABLET, FILM COATED ORAL
COMMUNITY
Start: 2021-12-15 | End: 2022-01-13 | Stop reason: SDUPTHER

## 2022-01-13 RX ORDER — OXYCODONE AND ACETAMINOPHEN 7.5; 325 MG/1; MG/1
1 TABLET ORAL 2 TIMES DAILY PRN
Qty: 60 TABLET | Refills: 0 | Status: SHIPPED | OUTPATIENT
Start: 2022-02-12 | End: 2022-01-13 | Stop reason: SDUPTHER

## 2022-01-13 RX ORDER — OXYCODONE AND ACETAMINOPHEN 7.5; 325 MG/1; MG/1
1 TABLET ORAL 2 TIMES DAILY PRN
Qty: 60 TABLET | Refills: 0 | Status: SHIPPED | OUTPATIENT
Start: 2022-01-13 | End: 2022-03-14 | Stop reason: SDUPTHER

## 2022-01-13 RX ORDER — OXYCODONE AND ACETAMINOPHEN 7.5; 325 MG/1; MG/1
1 TABLET ORAL 2 TIMES DAILY PRN
Qty: 60 TABLET | Refills: 0 | Status: SHIPPED | OUTPATIENT
Start: 2022-01-13 | End: 2022-01-13 | Stop reason: SDUPTHER

## 2022-01-13 RX ORDER — OXYCODONE AND ACETAMINOPHEN 7.5; 325 MG/1; MG/1
1 TABLET ORAL 2 TIMES DAILY PRN
Qty: 60 TABLET | Refills: 0 | Status: SHIPPED | OUTPATIENT
Start: 2022-02-12 | End: 2022-03-14 | Stop reason: SDUPTHER

## 2022-01-13 RX ORDER — RISPERIDONE 0.5 MG/1
TABLET ORAL
COMMUNITY
Start: 2021-12-15

## 2022-01-13 NOTE — TELEPHONE ENCOUNTER
HUB ATTEMPTED TO WARM TRANSFER CALL-   PATIENT CALLING BACK TO KNOW WHAT TO DO REGARDING HER APPT.  ADV THE NOTE HAS BEEN SENT TO APPROPRIATE STAFF- ADV THEY WILL CALL HER BACK AS SOON AS THEY CAN.- PATIENT STATES HER BACK IS REALLY HURTING

## 2022-01-13 NOTE — PROGRESS NOTES
Subjective    CC Neck pain,Back pain  Shavonne Falcon is a 66 y.o. female with chronic polyarthralgia, generalized myofascial pain from fibromyalgia, back pain neck pain from DDD spondylosis here for f/u.   Complains of worsening right-sided lumbar and thoracic paraspinal myofascial pain.  Denies injury denies radicular pain.  Chronic right foot pain, has 2 ulcers.  Was seen at [facility] podiatry and sent to wound care but could not follow-up due to distance.  She has been referred to podiatrist in [place] but has not followed up yet.  Continued chronic generalized myofascial pain and polyarthralgia worse with any activity.  Chronic back pain neck pain without radicular pain.  On chronic opioid therapy with oxycodone, seen at outside pain clinic/.  Was dismissed Due to failed urine drug screen and not showing up for pill count.    L-spine x-ray 2019Moderate degenerative change of the lumbar spine with mild to moderate loss of disc height at all levels.  No evidence of spondylolysis or listhesis.  No evidence of paraspinal mass, fracture or osteolytic or osteoblastic bony change  C-spine CT 2018 no acute findings, multiple level spondylosis.      Pain Assessment   Location of Pain: Lower Back, legs, neck pain, joint  Description of Pain: Dull/Aching, Throbbing, Stabbing  Previous Pain Rating :8  Current Pain Ratin  Aggravating Factors: Activity  Alleviating Factors: Rest, Medication    The following portions of the patient's history were reviewed and updated as appropriate: allergies, current medications, past family history, past medical history, past social history, past surgical history and problem list.      has a past medical history of Anxiety, Arthritis, Asthma, COPD (chronic obstructive pulmonary disease) (HCC), Depression, Fibromyalgia, primary, GERD (gastroesophageal reflux disease), Hyperlipidemia, Hypertension, Hypertension, Low back pain, Nerve damage, Neuropathy, and Sleep apnea.     . has a past  "surgical history that includes Brain surgery; Hysterectomy; Tonsillectomy; Cholecystectomy; Colonoscopy; Esophagogastroduodenoscopy; and Lung surgery.    family history includes Alcohol abuse in her brother, daughter, father, and maternal grandmother; Cancer in her father and mother; Heart attack in her mother; Hypertension in her mother.    Social History     Tobacco Use   • Smoking status: Current Every Day Smoker     Packs/day: 1.00     Types: Cigarettes   • Smokeless tobacco: Never Used   • Tobacco comment: on chantix   Substance Use Topics   • Alcohol use: No     Review of Systems   Musculoskeletal: Positive for arthralgias, back pain, gait problem, myalgias, neck pain and neck stiffness.   All other systems reviewed and are negative.    Objective   Physical Exam   Constitutional: No distress.   cane   Pulmonary/Chest: Effort normal.   Musculoskeletal:      Cervical back: She exhibits decreased range of motion and tenderness.      Lumbar back: She exhibits decreased range of motion and tenderness.   Vitals reviewed.    /72   Pulse 53   Resp 16   Ht 175.3 cm (69\")   Wt 80.7 kg (178 lb)   SpO2 99%   BMI 26.29 kg/m²     PHQ 9 on chart  Opioid risk tool low risk    Assessment/Plan   Diagnoses and all orders for this visit:    1. Chronic pain syndrome (Primary)  -     oxyCODONE-acetaminophen (PERCOCET) 7.5-325 MG per tablet; Take 1 tablet by mouth 2 (Two) Times a Day As Needed for Severe Pain .  Dispense: 60 tablet; Refill: 0  -     oxyCODONE-acetaminophen (PERCOCET) 7.5-325 MG per tablet; Take 1 tablet by mouth 2 (Two) Times a Day As Needed for Severe Pain . DNF before 2/12/2022  Dispense: 60 tablet; Refill: 0    2. Lumbosacral spondylosis without myelopathy    3. Cervical spondylosis without myelopathy    4. Fibromyalgia    5. Acute pain of right foot    6. High risk medication use    Summary  Shavonne Falcon is a 66 y.o. female with chronic polyarthralgia, generalized myofascial pain from fibromyalgia, " back pain neck pain from DDD spondylosis here for follow-up.    Chronic pain from lumbar and cervical DDD spondylosis, fibromyalgia and polyarthralgia.  History of chronic polyneuropathy/craniotomy.    Complains of worsening right-sided lumbar and thoracic paraspinal myofascial pain.  Denies injury denies radicular pain.   Start compounded topical anti-inflammatory/antineuropathic  cream.  If persist will consider Lspine imaging.    Continue oxycodone 7.5/325 twice daily as needed for severe pain.  UDS and inspect reviewed.  Discussed risk of tolerance, dependence, respiratory depression, coma and death associated with use of oral opioids for treatment of chronic nonmalignant pain.     RTC 2 month

## 2022-01-13 NOTE — TELEPHONE ENCOUNTER
Hub staff attempted to follow warm transfer process and was unsuccessful     Caller: MANDIE TILLEY    Relationship to patient: SELF    Best call back number: [phone]    Patient is needing: PT SAID THAT HER BACK IS IN A LOT OF PAIN BECAUSE SHE THINKS SHE HAS PNEUMONIA. PT DOES NOT KNOW IF HER BACK CAN HANDLE SITTING THE LONG AMOUNT OF TIME IT WOULD TAKE FOR HER APPT BUT KNOWS HER MEDS ARE DUE AND WANTS TO KNOW IF THERE IS SOMETHING ELSE SHE COULD DO. PLEASE CALL HER BACK AT THE NUMBER ABOVE.

## 2022-03-14 ENCOUNTER — OFFICE VISIT (OUTPATIENT)
Dept: PAIN MEDICINE | Facility: CLINIC | Age: 67
End: 2022-03-14

## 2022-03-14 VITALS
RESPIRATION RATE: 16 BRPM | HEIGHT: 69 IN | OXYGEN SATURATION: 100 % | BODY MASS INDEX: 26.36 KG/M2 | SYSTOLIC BLOOD PRESSURE: 150 MMHG | HEART RATE: 72 BPM | WEIGHT: 178 LBS | DIASTOLIC BLOOD PRESSURE: 90 MMHG

## 2022-03-14 DIAGNOSIS — M47.817 LUMBOSACRAL SPONDYLOSIS WITHOUT MYELOPATHY: ICD-10-CM

## 2022-03-14 DIAGNOSIS — Z79.899 HIGH RISK MEDICATION USE: ICD-10-CM

## 2022-03-14 DIAGNOSIS — M47.812 CERVICAL SPONDYLOSIS WITHOUT MYELOPATHY: ICD-10-CM

## 2022-03-14 DIAGNOSIS — M79.7 FIBROMYALGIA: ICD-10-CM

## 2022-03-14 DIAGNOSIS — G89.4 CHRONIC PAIN SYNDROME: Primary | ICD-10-CM

## 2022-03-14 DIAGNOSIS — M79.671 RIGHT FOOT PAIN: ICD-10-CM

## 2022-03-14 PROCEDURE — 99214 OFFICE O/P EST MOD 30 MIN: CPT | Performed by: ANESTHESIOLOGY

## 2022-03-14 RX ORDER — SUCRALFATE 1 G/1
TABLET ORAL
COMMUNITY
Start: 2022-02-11

## 2022-03-14 RX ORDER — OXYCODONE AND ACETAMINOPHEN 7.5; 325 MG/1; MG/1
1 TABLET ORAL 3 TIMES DAILY PRN
Qty: 90 TABLET | Refills: 0 | Status: SHIPPED | OUTPATIENT
Start: 2022-03-14 | End: 2022-05-11 | Stop reason: SDUPTHER

## 2022-03-14 RX ORDER — FLUTICASONE PROPIONATE 50 MCG
SPRAY, SUSPENSION (ML) NASAL
COMMUNITY
Start: 2022-03-01

## 2022-03-14 RX ORDER — CYCLOBENZAPRINE HCL 10 MG
TABLET ORAL
COMMUNITY
Start: 2022-01-13

## 2022-03-14 RX ORDER — METOPROLOL TARTRATE 50 MG/1
TABLET, FILM COATED ORAL
COMMUNITY
Start: 2022-02-11 | End: 2022-03-14 | Stop reason: SDUPTHER

## 2022-03-14 RX ORDER — OXYCODONE AND ACETAMINOPHEN 7.5; 325 MG/1; MG/1
1 TABLET ORAL 3 TIMES DAILY PRN
Qty: 90 TABLET | Refills: 0 | Status: SHIPPED | OUTPATIENT
Start: 2022-04-13 | End: 2022-04-12 | Stop reason: SDUPTHER

## 2022-03-14 NOTE — PROGRESS NOTES
Subjective    CC Neck pain,Back pain  Shavonne Falcon is a 66 y.o. female with chronic polyarthralgia, generalized myofascial pain from fibromyalgia, back pain neck pain from DDD spondylosis here for f/u.   Continued and worsening right foot pain.  She is requesting new referral to podiatry.  She has seen podiatry at [facility] up until last October, states she will not go to [facility] anymore because the provider left.  Chronic right foot pain, has 2 ulcers.  Was seen at [facility] podiatry and sent to wound care but could not follow-up due to distance.   Chronic generalized myofascial pain and polyarthralgia worse with any activity.  Chronic back pain neck pain without radicular pain.  On chronic opioid therapy with oxycodone, seen at outside pain clinic/.  Was dismissed Due to failed urine drug screen and not showing up for pill count.    L-spine x-ray 2019Moderate degenerative change of the lumbar spine with mild to moderate loss of disc height at all levels.  No evidence of spondylolysis or listhesis.  No evidence of paraspinal mass, fracture or osteolytic or osteoblastic bony change  C-spine CT 2018 no acute findings, multiple level spondylosis.      Pain Assessment   Location of Pain: Lower Back, legs, neck pain, joint  Description of Pain: Dull/Aching, Throbbing, Stabbing  Previous Pain Rating :8  Current Pain Ratin  Aggravating Factors: Activity  Alleviating Factors: Rest, Medication    The following portions of the patient's history were reviewed and updated as appropriate: allergies, current medications, past family history, past medical history, past social history, past surgical history and problem list.      has a past medical history of Anxiety, Arthritis, Asthma, COPD (chronic obstructive pulmonary disease) (HCC), Depression, Fibromyalgia, primary, GERD (gastroesophageal reflux disease), Hyperlipidemia, Hypertension, Hypertension, Low back pain, Nerve damage, Neuropathy, and Sleep apnea.     . has  "a past surgical history that includes Brain surgery; Hysterectomy; Tonsillectomy; Cholecystectomy; Colonoscopy; Esophagogastroduodenoscopy; and Lung surgery.    family history includes Alcohol abuse in her brother, daughter, father, and maternal grandmother; Cancer in her father and mother; Heart attack in her mother; Hypertension in her mother.    Social History     Tobacco Use   • Smoking status: Current Every Day Smoker     Packs/day: 1.00     Types: Cigarettes   • Smokeless tobacco: Never Used   • Tobacco comment: on chantix   Substance Use Topics   • Alcohol use: No     Review of Systems   Musculoskeletal: Positive for arthralgias, back pain, gait problem, myalgias, neck pain and neck stiffness.   All other systems reviewed and are negative.    Objective   Physical Exam   Constitutional: No distress.   cane   Pulmonary/Chest: Effort normal.   Musculoskeletal:      Cervical back: She exhibits decreased range of motion and tenderness.      Lumbar back: She exhibits decreased range of motion and tenderness.   Vitals reviewed.    /90   Pulse 72   Resp 16   Ht 175.3 cm (69\")   Wt 80.7 kg (178 lb)   SpO2 100%   BMI 26.29 kg/m²     PHQ 9 on chart  Opioid risk tool low risk    Assessment/Plan   Diagnoses and all orders for this visit:    1. Chronic pain syndrome (Primary)  -     oxyCODONE-acetaminophen (PERCOCET) 7.5-325 MG per tablet; Take 1 tablet by mouth 3 (Three) Times a Day As Needed for Severe Pain . DNF before 4/13/2022  Dispense: 90 tablet; Refill: 0  -     oxyCODONE-acetaminophen (PERCOCET) 7.5-325 MG per tablet; Take 1 tablet by mouth 3 (Three) Times a Day As Needed for Severe Pain . DNF before 2/12/2022  Dispense: 90 tablet; Refill: 0    2. Lumbosacral spondylosis without myelopathy    3. Cervical spondylosis without myelopathy    4. Fibromyalgia    5. Right foot pain  -     Ambulatory Referral to Podiatry    6. High risk medication use    Summary  Shavonne Falcon is a 66 y.o. female with chronic " polyarthralgia, generalized myofascial pain from fibromyalgia, back pain neck pain from DDD spondylosis here for follow-up.    Chronic pain from lumbar and cervical DDD spondylosis, fibromyalgia and polyarthralgia.  History of chronic polyneuropathy/craniotomy.    Continued and worsening right foot pain.  She is requesting new referral to podiatry.  She has seen podiatry at [facility] up until last October, states she will not go to [facility] anymore because the provider left.  Referred to Dr. Rose for evaluation.    Good relief of oxycodone but not lasting long.  Will increase to 3 times daily.  Continue oxycodone 7.5/325 3 times daily as needed for severe pain.  UDS and inspect reviewed.  Discussed risk of tolerance, dependence, respiratory depression, coma and death associated with use of oral opioids for treatment of chronic nonmalignant pain.     RTC 2 month

## 2022-04-12 ENCOUNTER — TELEPHONE (OUTPATIENT)
Dept: PAIN MEDICINE | Facility: CLINIC | Age: 67
End: 2022-04-12

## 2022-04-12 DIAGNOSIS — G89.4 CHRONIC PAIN SYNDROME: ICD-10-CM

## 2022-04-12 RX ORDER — OXYCODONE AND ACETAMINOPHEN 7.5; 325 MG/1; MG/1
1 TABLET ORAL 3 TIMES DAILY PRN
Qty: 90 TABLET | Refills: 0 | Status: SHIPPED | OUTPATIENT
Start: 2022-04-13 | End: 2022-05-19 | Stop reason: SDUPTHER

## 2022-04-12 NOTE — TELEPHONE ENCOUNTER
Caller: Shavonne Falcon I    Relationship: Self    Best call back number: [phone]    What is the best time to reach you: ANYTIME    Who are you requesting to speak with (clinical staff, provider,  specific staff member): CLINICAL    Do you require a callback: YES.  PT IS CALLING TO CHECK THE STATUS OF HER MEDICATION REFILL - OXYCODONE 7.5-325MG. PT WOULD LIKE TO  MEDICATION TODAY AND SHE STATED SHE HASNT GOTTEN AN UPDATE. PLEASE CALL PATIENT.    ATTEMPTED TO WARM TRANSFER

## 2022-04-12 NOTE — TELEPHONE ENCOUNTER
Caller: PATIENT     Relationship: SELF     Best call back number:  [phone]      Requested Prescriptions: OXYCODONE 7.  5-325 MG.        Pharmacy where request should be sent:  [facility]  [phone]     Additional details provided by patient:  PT. IS ASKING IF REFILL CAN BE APPROVED FOR  TODAY.   STATES THAT HER  IS HAVING KIDNEY REMOVAL SURGERY AND SHE DOES NOT HAVE ANYONE WHO CAN HELP HER WHEN HE IS HOSPITAL.   HER CAREGIVER/HELPER IS THERE TODAY AND CAN HELP HER . PLEASE CALL TO LET PT. KNOW.     Does the patient have less than a 3 day supply:  [x] Yes  [] No

## 2022-04-12 NOTE — TELEPHONE ENCOUNTER
I assume that would be OK with Dr. Walton, it is only one day early. Inspect reviewed, sent, thanks.

## 2022-05-09 ENCOUNTER — TELEPHONE (OUTPATIENT)
Dept: PAIN MEDICINE | Facility: CLINIC | Age: 67
End: 2022-05-09

## 2022-05-09 NOTE — TELEPHONE ENCOUNTER
Caller: Shavonne Falcon I    Relationship to patient: Self    Best call back number:     Chief complaint: BACK PAIN     Type of visit: FUP FOR MEDICATION    Requested date: 5/13/22    Additional notes:PATIENT STATES NEEDS APPT ON Friday BECAUSE PAIN MEDICATION RUNS OUT THAT DAY.  I OFFERED APPT FOR Monday 5/16 [facility]

## 2022-05-09 NOTE — TELEPHONE ENCOUNTER
Caller: Shavonne Falcon    Relationship: Self    Best call back number: [phone]    Requested Prescriptions:   Requested Prescriptions      No prescriptions requested or ordered in this encounter        Pharmacy where request should be sent:  [facility]    Additional details provided by patient: PATIENT IS REQUESTING THAT SHE GET A PARTIAL FILL UNTL SHE CAN GET INTO OFFICE- SHE WILL RUN OUT OF MEDICATION ON Friday .    Does the patient have less than a 3 day supply:  [x] Yes  [x] No    Cassia Swenson   05/09/22 15:19 EDT

## 2022-05-10 ENCOUNTER — TELEPHONE (OUTPATIENT)
Dept: PAIN MEDICINE | Facility: CLINIC | Age: 67
End: 2022-05-10

## 2022-05-10 DIAGNOSIS — G89.4 CHRONIC PAIN SYNDROME: ICD-10-CM

## 2022-05-10 RX ORDER — OXYCODONE AND ACETAMINOPHEN 7.5; 325 MG/1; MG/1
1 TABLET ORAL 3 TIMES DAILY PRN
Qty: 90 TABLET | Refills: 0 | Status: CANCELLED | OUTPATIENT
Start: 2022-05-10

## 2022-05-10 NOTE — TELEPHONE ENCOUNTER
Rx Refill Note  Requested Prescriptions     Pending Prescriptions Disp Refills   • oxyCODONE-acetaminophen (PERCOCET) 7.5-325 MG per tablet 90 tablet 0     Sig: Take 1 tablet by mouth 3 (Three) Times a Day As Needed for Severe Pain . DNF before 2/12/2022      Last office visit with prescribing clinician: 3/14/2022      Next office visit with prescribing clinician: 5/19/2022            Irais Mata MA  05/10/22, 10:48 EDT

## 2022-05-10 NOTE — TELEPHONE ENCOUNTER
Caller: Shavonne Falcon    Relationship: Self         Best call back number:  Requested Prescriptions:   OXYCODONE  Pharmacy where request should be sent:    [id number]  Additional details provided by patient: PATIENT WAS NOT ABLE TO GET TRANSPORTATION FOR AN APPT UNTIL THE 19TH AND HER PRESCRIPTION RUNS OUT ON FRIDAY    Does the patient have less than a 3 day supply:  [x] Yes  [] No    Lg Paulino Rep   05/10/22 10:11 EDT

## 2022-05-11 ENCOUNTER — TELEPHONE (OUTPATIENT)
Dept: PAIN MEDICINE | Facility: CLINIC | Age: 67
End: 2022-05-11

## 2022-05-11 DIAGNOSIS — G89.4 CHRONIC PAIN SYNDROME: ICD-10-CM

## 2022-05-11 RX ORDER — OXYCODONE AND ACETAMINOPHEN 7.5; 325 MG/1; MG/1
1 TABLET ORAL 3 TIMES DAILY PRN
Qty: 28 TABLET | Refills: 0 | Status: SHIPPED | OUTPATIENT
Start: 2022-05-12 | End: 2022-05-19 | Stop reason: SDUPTHER

## 2022-05-11 NOTE — TELEPHONE ENCOUNTER
UNABLE TO WARM TRANSFER     Caller: Shavonne Falcon    Relationship: Self    Best call back number: [phone]    What is the best time to reach you: ANYTIME      What was the call regarding: RX REFILL - OXYCODONE 7.5-325 MG    Do you require a callback: YES

## 2022-05-19 ENCOUNTER — OFFICE VISIT (OUTPATIENT)
Dept: PAIN MEDICINE | Facility: CLINIC | Age: 67
End: 2022-05-19

## 2022-05-19 VITALS
HEIGHT: 69 IN | RESPIRATION RATE: 16 BRPM | BODY MASS INDEX: 26.36 KG/M2 | SYSTOLIC BLOOD PRESSURE: 199 MMHG | HEART RATE: 69 BPM | DIASTOLIC BLOOD PRESSURE: 86 MMHG | OXYGEN SATURATION: 98 % | WEIGHT: 178 LBS

## 2022-05-19 DIAGNOSIS — M47.817 LUMBOSACRAL SPONDYLOSIS WITHOUT MYELOPATHY: ICD-10-CM

## 2022-05-19 DIAGNOSIS — Z79.899 HIGH RISK MEDICATION USE: ICD-10-CM

## 2022-05-19 DIAGNOSIS — Z79.899 HIGH RISK MEDICATION USE: Primary | ICD-10-CM

## 2022-05-19 DIAGNOSIS — G89.4 CHRONIC PAIN SYNDROME: Primary | ICD-10-CM

## 2022-05-19 DIAGNOSIS — M79.7 FIBROMYALGIA: ICD-10-CM

## 2022-05-19 DIAGNOSIS — M79.671 RIGHT FOOT PAIN: ICD-10-CM

## 2022-05-19 DIAGNOSIS — M47.812 CERVICAL SPONDYLOSIS WITHOUT MYELOPATHY: ICD-10-CM

## 2022-05-19 PROCEDURE — 99214 OFFICE O/P EST MOD 30 MIN: CPT | Performed by: ANESTHESIOLOGY

## 2022-05-19 RX ORDER — OXYCODONE AND ACETAMINOPHEN 7.5; 325 MG/1; MG/1
1 TABLET ORAL 3 TIMES DAILY PRN
Qty: 90 TABLET | Refills: 0 | Status: SHIPPED | OUTPATIENT
Start: 2022-05-21

## 2022-05-19 RX ORDER — OXYCODONE AND ACETAMINOPHEN 7.5; 325 MG/1; MG/1
1 TABLET ORAL 3 TIMES DAILY PRN
Qty: 90 TABLET | Refills: 0 | Status: SHIPPED | OUTPATIENT
Start: 2022-06-20

## 2022-05-19 NOTE — PROGRESS NOTES
Subjective    CC Neck pain,Back pain  Shavonne Falcon is a 66 y.o. female with chronic polyarthralgia, generalized myofascial pain from fibromyalgia, back pain neck pain from DDD spondylosis here for f/u.   Denies new complaints today.  Chronic right foot pain, has 2 ulcers.  Was seen at [facility]/ of  podiatry and sent to wound care but could not follow-up due to distance.   Chronic generalized myofascial pain and polyarthralgia worse with any activity.  Chronic back pain neck pain without radicular pain.  On chronic opioid therapy with oxycodone, seen at outside pain clinic/.  Was dismissed Due to failed urine drug screen and not showing up for pill count.    L-spine x-ray 2019Moderate degenerative change of the lumbar spine with mild to moderate loss of disc height at all levels.  No evidence of spondylolysis or listhesis.  No evidence of paraspinal mass, fracture or osteolytic or osteoblastic bony change  C-spine CT 2018 no acute findings, multiple level spondylosis.      Pain Assessment   Location of Pain: Lower Back, legs, neck pain, joint  Description of Pain: Dull/Aching, Throbbing, Stabbing  Previous Pain Rating :8  Current Pain Ratin  Aggravating Factors: Activity  Alleviating Factors: Rest, Medication  PEG Assessment   What number best describes your pain on average in the past week?6  What number best describes how, during the past week, pain has interfered with your enjoyment of life?6  What number best describes how, during the past week, pain has interfered with your general activity?10        The following portions of the patient's history were reviewed and updated as appropriate: allergies, current medications, past family history, past medical history, past social history, past surgical history and problem list.      has a past medical history of Anxiety, Arthritis, Asthma, COPD (chronic obstructive pulmonary disease) (HCC), Depression, Fibromyalgia, primary, GERD (gastroesophageal reflux  "disease), Hyperlipidemia, Hypertension, Hypertension, Low back pain, Nerve damage, Neuropathy, and Sleep apnea.     . has a past surgical history that includes Brain surgery; Hysterectomy; Tonsillectomy; Cholecystectomy; Colonoscopy; Esophagogastroduodenoscopy; and Lung surgery.    family history includes Alcohol abuse in her brother, daughter, father, and maternal grandmother; Cancer in her father and mother; Heart attack in her mother; Hypertension in her mother.    Social History     Tobacco Use   • Smoking status: Current Every Day Smoker     Packs/day: 1.00     Types: Cigarettes   • Smokeless tobacco: Never Used   • Tobacco comment: on chantix   Substance Use Topics   • Alcohol use: No     Review of Systems   Musculoskeletal: Positive for arthralgias, back pain, gait problem, myalgias, neck pain and neck stiffness.   All other systems reviewed and are negative.    Objective   Physical Exam   Constitutional: No distress.   cane   Pulmonary/Chest: Effort normal.   Musculoskeletal:      Cervical back: She exhibits decreased range of motion and tenderness.      Lumbar back: She exhibits decreased range of motion and tenderness.   Vitals reviewed.    BP (!) 199/86   Pulse 69   Resp 16   Ht 175.3 cm (69\")   Wt 80.7 kg (178 lb)   SpO2 98%   BMI 26.29 kg/m²     PHQ 9 on chart  Opioid risk tool low risk    Assessment & Plan   Diagnoses and all orders for this visit:    1. Chronic pain syndrome (Primary)  -     oxyCODONE-acetaminophen (PERCOCET) 7.5-325 MG per tablet; Take 1 tablet by mouth 3 (Three) Times a Day As Needed for Severe Pain . DNF before 6/20/2022.  Dispense: 90 tablet; Refill: 0  -     oxyCODONE-acetaminophen (PERCOCET) 7.5-325 MG per tablet; Take 1 tablet by mouth 3 (Three) Times a Day As Needed for Severe Pain . DNF before 5/21/2022  Dispense: 90 tablet; Refill: 0    2. Lumbosacral spondylosis without myelopathy    3. Cervical spondylosis without myelopathy    4. Fibromyalgia    5. Right foot " pain    6. High risk medication use    Summary  Shavonne Falcon is a 66 y.o. female with chronic polyarthralgia, generalized myofascial pain from fibromyalgia, back pain neck pain from DDD spondylosis here for follow-up.    Chronic pain from lumbar and cervical DDD spondylosis, fibromyalgia and polyarthralgia.  History of chronic polyneuropathy/craniotomy.    Denies new complaints today.    Better relief with 3 times daily dosing on oxycodone.  Continue oxycodone 7.5/325 3 times daily as needed for severe pain.  UDS and inspect reviewed.  Discussed risk of tolerance, dependence, respiratory depression, coma and death associated with use of oral opioids for treatment of chronic nonmalignant pain.     RTC 2 month

## 2022-08-15 ENCOUNTER — TELEPHONE (OUTPATIENT)
Dept: PODIATRY | Facility: CLINIC | Age: 67
End: 2022-08-15

## 2022-08-15 NOTE — TELEPHONE ENCOUNTER
Caller: MANDIE  Relationship to Patient: SELF    Phone Number: [phone]  Reason for Call: PT CALLED STATING THAT HER FOOT PAIN HAS INCREASED AND NEEDS WORKED IN SOONER IF POSSIBLE. PT ALSO STATES THAT HER REFERRING PROVIDER WAS SENDING IMAGINE DISC TO OFFICE PT NEEDS TO CONFIRM IF THIS HAS BEEN RECEIVED. PLEASE ADVISE AT ABOVE PHONE NUMBER

## 2022-08-15 NOTE — TELEPHONE ENCOUNTER
CALLED AND LVM ADVISING WE DO NOT HAVE ANY SOONER APPTS AND THAT WE HAVE NOT RECEIVED THE IMAGING AS OF YET. ADVISED IF THERE ARE ANY QUESTIONS, TO GIVE US A CALL BACK [phone].

## 2022-09-28 ENCOUNTER — OFFICE VISIT (OUTPATIENT)
Dept: PODIATRY | Facility: CLINIC | Age: 67
End: 2022-09-28

## 2022-09-28 ENCOUNTER — TELEPHONE (OUTPATIENT)
Dept: PODIATRY | Facility: CLINIC | Age: 67
End: 2022-09-28

## 2022-09-28 VITALS — HEIGHT: 69 IN | BODY MASS INDEX: 26.36 KG/M2 | WEIGHT: 178 LBS

## 2022-09-28 DIAGNOSIS — L84 PRE-ULCERATIVE CALLUSES: ICD-10-CM

## 2022-09-28 DIAGNOSIS — M25.571 CHRONIC PAIN OF RIGHT ANKLE: Primary | ICD-10-CM

## 2022-09-28 DIAGNOSIS — G62.89 OTHER POLYNEUROPATHY: ICD-10-CM

## 2022-09-28 DIAGNOSIS — L97.522 CHRONIC ULCER OF LEFT FOOT WITH FAT LAYER EXPOSED: ICD-10-CM

## 2022-09-28 DIAGNOSIS — G89.29 CHRONIC PAIN OF RIGHT ANKLE: Primary | ICD-10-CM

## 2022-09-28 DIAGNOSIS — S86.011S ACHILLES TENDON RUPTURE, RIGHT, SEQUELA: ICD-10-CM

## 2022-09-28 DIAGNOSIS — M14.672 CHARCOT'S JOINT OF FOOT, LEFT: ICD-10-CM

## 2022-09-28 PROCEDURE — 99214 OFFICE O/P EST MOD 30 MIN: CPT | Performed by: PODIATRIST

## 2022-09-28 RX ORDER — DOXYCYCLINE HYCLATE 100 MG
100 TABLET ORAL 2 TIMES DAILY
Qty: 20 TABLET | Refills: 0 | Status: SHIPPED | OUTPATIENT
Start: 2022-09-28 | End: 2022-10-08

## 2022-09-28 NOTE — TELEPHONE ENCOUNTER
Provider: ADE  Caller: MANDIE TILLEY  Relationship to Patient: PATIENT  Pharmacy: N/A  Phone Number: [phone]  Reason for Call: PATIENT CALLED TO SAY THAT SHE WOULD LIKE TO GO TO [facility]      FAX: [phone]

## 2022-09-28 NOTE — PROGRESS NOTES
09/28/2022  Foot and Ankle Surgery - New Patient   Provider: Dr. Romel Rose DPM  Location: [facility]    Subjective:  Shavonne Falcon is a 67 y.o. female.     Chief Complaint   Patient presents with   • Left Foot - Foot Ulcer   • Right Ankle - Pain   • Initial Evaluation     JOIE JENSEN MD UNKNOWN       HPI:    Shavonne Falcon is a 67 year-old female who presents to the office today for evaluation of right ankle pain.    The patient reports that she has been dealing with ankle pain for a long time. She states that the bones are coming out of her right heel, and she was told that it was attached to her Achilles tendon. She adds that she is getting sores on her left foot.  She has back pain and  peripheral neuropathy. The patient states that she had brain surgery done and since the brain surgery, she got really bad neuropathy.     The patient states that she had an open wound in her left foot for a couple of weeks. She denies any redness or drainage. She has been taking amoxicillin for bronchitis.    The patient reports that she has COPD. She had a stent in her heart approximately 1 year ago. She smokes approximately 1 pack of cigarettes per day. She denies any illicit drug use.       Allergies   Allergen Reactions   • Ceclor [Cefaclor] Hives   • Erythromycin GI Intolerance     Other reaction(s): Weal   • Trovafloxacin Hives     Other reaction(s): Pruritic rash   • Adhesive Tape Rash     Other reaction(s): Itching of Skin, Pruritic rash  Rash and itch-- plastic tape   • Tape Rash     Rash and itch-- plastic tape       Past Medical History:   Diagnosis Date   • Anxiety    • Arthritis    • Asthma    • COPD (chronic obstructive pulmonary disease) (HCC)    • Depression    • Fibromyalgia, primary    • GERD (gastroesophageal reflux disease)    • Hyperlipidemia    • Hypertension    • Hypertension    • Low back pain    • Nerve damage     severe-- from brain surgery--2004---  colloid cyst noncancerous removed   •  Neuropathy    • Sleep apnea        Past Surgical History:   Procedure Laterality Date   • BRAIN SURGERY     • CHOLECYSTECTOMY     • COLONOSCOPY     • ENDOSCOPY     • HYSTERECTOMY     • LUNG SURGERY      due to swallowing hot asael---2018--   • TONSILLECTOMY         Family History   Problem Relation Age of Onset   • Hypertension Mother    • Cancer Mother    • Heart attack Mother    • Cancer Father    • Alcohol abuse Father    • Alcohol abuse Brother    • Alcohol abuse Daughter    • Alcohol abuse Maternal Grandmother        Social History     Socioeconomic History   • Marital status:    Tobacco Use   • Smoking status: Current Every Day Smoker     Packs/day: 1.00     Types: Cigarettes   • Smokeless tobacco: Never Used   • Tobacco comment: on chantix   Vaping Use   • Vaping Use: Never used   Substance and Sexual Activity   • Alcohol use: No   • Drug use: No   • Sexual activity: Defer        Current Outpatient Medications on File Prior to Visit   Medication Sig Dispense Refill   • albuterol sulfate  (90 Base) MCG/ACT inhaler ProAir HFA 90 mcg/actuation aerosol inhaler   Inhale by inhalation route for 33 days.     • allopurinol (ZYLOPRIM) 300 MG tablet Take 300 mg by mouth Daily.     • amLODIPine (NORVASC) 5 MG tablet Take 5 mg by mouth Daily.     • atorvastatin (LIPITOR) 40 MG tablet      • Azelastine HCl 137 MCG/SPRAY solution azelastine 137 mcg (0.1 %) nasal spray aerosol     • Bevespi Aerosphere 9-4.8 MCG/ACT aerosol      • Brilinta 60 MG tablet tablet      • cyclobenzaprine (FLEXERIL) 10 MG tablet As needed     • diphenoxylate-atropine (LOMOTIL) 2.5-0.025 MG per tablet diphenoxylate-atropine 2.5 mg-0.025 mg tablet     • Eliquis 5 MG tablet tablet      • ferrous sulfate 325 (65 FE) MG tablet Take 325 mg by mouth Daily With Breakfast.     • fluticasone (FLONASE) 50 MCG/ACT nasal spray      • folic acid (FOLVITE) 1 MG tablet      • gabapentin (NEURONTIN) 800 MG tablet Take 1 tablet by mouth 3 (Three) Times  a Day. 90 tablet 11   • hydrALAZINE (APRESOLINE) 100 MG tablet Every 12 (Twelve) Hours.     • ibuprofen (ADVIL,MOTRIN) 800 MG tablet      • ipratropium-albuterol (DUO-NEB) 0.5-2.5 mg/3 ml nebulizer      • levETIRAcetam (KEPPRA) 1000 MG tablet      • levothyroxine (SYNTHROID, LEVOTHROID) 50 MCG tablet      • metoprolol succinate XL (TOPROL-XL) 50 MG 24 hr tablet TAKE 1 TABLET EVERY DAY     • metroNIDAZOLE (FLAGYL) 500 MG tablet      • montelukast (SINGULAIR) 10 MG tablet      • mupirocin (BACTROBAN) 2 % ointment      • NON FORMULARY      • OLANZapine (zyPREXA) 2.5 MG tablet olanzapine 2.5 mg tablet     • ondansetron (ZOFRAN) 8 MG tablet      • ondansetron ODT (ZOFRAN-ODT) 8 MG disintegrating tablet Take  by mouth.     • oxyCODONE-acetaminophen (PERCOCET) 7.5-325 MG per tablet Take 1 tablet by mouth 3 (Three) Times a Day As Needed for Severe Pain . DNF before 6/20/2022. 90 tablet 0   • oxyCODONE-acetaminophen (PERCOCET) 7.5-325 MG per tablet Take 1 tablet by mouth 3 (Three) Times a Day As Needed for Severe Pain . DNF before 5/21/2022 90 tablet 0   • pantoprazole (PROTONIX) 40 MG EC tablet pantoprazole 40 mg tablet,delayed release     • risperiDONE (risperDAL) 0.5 MG tablet      • simvastatin (ZOCOR) 20 MG tablet SIMVASTATIN 20 MG TABS     • sucralfate (CARAFATE) 1 g tablet As needed     • Unable to find 1 each 1 (One) Time. Med Name: **     • varenicline (CHANTIX) 1 MG tablet Take 1 mg by mouth 2 (Two) Times a Day.       No current facility-administered medications on file prior to visit.       Review of Systems:  General: Denies fever, chills, fatigue, and weakness.  Eyes: Denies vision loss, blurry vision, and excessive redness.  ENT: Denies hearing issues and difficulty swallowing.  Cardiovascular: Denies palpitations, chest pain, or syncopal episodes.  Respiratory: Denies shortness of breath, wheezing, and coughing.  GI: Denies abdominal pain, nausea, and vomiting.   : Denies frequency, hematuria, and  "urgency.  Musculoskeletal: Denies muscle cramps, joint pains, and stiffness.  Derm: Denies rash, open wounds, or suspicious lesions.  Neuro: Denies headaches, numbness, loss of coordination, and tremors.  Psych: Denies anxiety and depression.  Endocrine: Denies temperature intolerance and changes in appetite.  Heme: Denies bleeding disorders or abnormal bruising.     Objective   Ht 175.3 cm (69\")   Wt 80.7 kg (178 lb)   BMI 26.29 kg/m²     Foot/Ankle Exam:       General:   Diabetic Foot Exam Performed    Appearance: obesity      VASCULAR      Right Foot Vascularity   Normal vascular exam    Dorsalis pedis:  2+  Posterior tibial:  2+  Skin Temperature: warm    Edema Grading:  None  CFT:  < 3 seconds  Pedal Hair Growth:  Present  Varicosities: none       Left Foot Vascularity   Normal vascular exam    Dorsalis pedis:  2+  Posterior tibial:  2+  Skin Temperature: warm    Edema Grading:  None  CFT:  < 3 seconds  Pedal Hair Growth:  Present  Varicosities: none        NEUROLOGIC     Right Foot Neurologic   Normal sensation    Light touch sensation:  Normal  Vibratory sensation:  Normal  Hot/Cold sensation: normal    Normal reflexes    Achilles reflex:  2+  Babinski reflex:  2+     Left Foot Neurologic   Normal sensation    Light touch sensation:  Normal  Vibratory sensation:  Normal  Hot/cold sensation: normal    Normal reflexes    Achilles reflex:  2+  Babinski reflex:  2+     MUSCLE STRENGTH     Right Foot Muscle Strength   Normal strength    Foot dorsiflexion:  5  Foot plantar flexion:  5  Foot inversion:  5  Foot eversion:  5     Left Foot Muscle Strength   Foot dorsiflexion:  5  Foot plantar flexion:  5  Foot inversion:  5  Foot eversion:  5      Right Foot Additional Comments Significant Charcot foot deformity on the left lower extremity with open circular wound to the plantar lateral aspect of the midfoot region measuring approximately 1 cm in diameter. Mostly granular wound base without any significant amount " of, tunneling, or tracking. No evidence of infection.     Right foot has a large bony prominence involving the posterior aspect of the ankle consistent with calcaneal bone retraction. No open wounds or signs of infection. Substantial loss of protective sensation with light touch and monofilament testing to both lower extremities.      Left Foot Additional Comments:  Significant Charcot foot deformity on the left lower extremity with open circular wound to the plantar lateral aspect of the midfoot region measuring approximately 1 cm in diameter. Mostly granular wound base without any significant amount of, tunneling, or tracking. No evidence of infection.     Right foot has a large bony prominence involving the posterior aspect of the ankle consistent with calcaneal bone retraction. No open wounds or signs of infection. Substantial loss of protective sensation with light touch and monofilament testing to both lower extremities.      Assessment & Plan   Diagnoses and all orders for this visit:    1. Chronic pain of right ankle (Primary)  -     XR Ankle 3+ View Right  -     Ambulatory Referral to Pain Management    2. Charcot's joint of foot, left  -     XR Foot 3+ View Left  -     Ambulatory Referral to Pain Management  -     Ambulatory Referral to Home Health (Outpatient)    3. Chronic ulcer of left foot with fat layer exposed (HCC)    4. Achilles tendon rupture, right, sequela    5. Pre-ulcerative calluses    6. Other polyneuropathy    Other orders  -     doxycycline (VIBRAMYICN) 100 MG tablet; Take 1 tablet by mouth 2 (Two) Times a Day for 10 days.  Dispense: 20 tablet; Refill: 0    Patient is a 67-year-old female with known peripheral neuropathy and significant issues involving both feet.  Patient complains of chronic pain and limitation due to these issues.  She states that she has seen an orthopedic surgeon in the past who has referred her to me for management.  Imaging was obtained of both lower extremities.   Significant Charcot foot deformity was noted on the left which appears relatively stable.  The right lower extremity has neglected Achilles tendon rupture with posterior calcaneal fragment.  Patient has an open wound involving the plantar aspect of her left Charcot foot.  No signs of infection are noted but I have recommended that we proceed with a course of doxycycline for suppression.  I have asked that she proceed with Betadine dressing changes on a daily basis.  Patient complains of significant pain to both lower extremities.  I have prescribed a topical compound pain cream and also referred her to pain management for issues.  Patient understands that she has very profound issues that will require offloading and nonweightbearing for management definitively.  She understands that she is at high risk of further issues and overall immobility.  Patient is to return in 2 weeks for reevaluation of the left foot wound.  I am unable to offload the wound in a cam boot due to her foot deformity.  She needs to remain mostly off weightbearing as possible.  Greater than 45 minutes was spent before, during, and after evaluation for patient care.      Transcribed from ambient dictation for EMILI Rose DPM by Luis Alfredo Treadwell.  09/28/22   13:43 EDT    Patient verbalized consent to the visit recording.  I have personally performed the services described in this document as transcribed by the above individual, and it is both accurate and complete.      Orders Placed This Encounter   Procedures   • XR Ankle 3+ View Right     Order Specific Question:   Reason for Exam:     Answer:   RIGTH ANKLE BUMP / PAIN X 2021  ROOM 15  WB     Order Specific Question:   Does this patient have a diabetic monitoring/medication delivering device on?     Answer:   No     Order Specific Question:   Release to patient     Answer:   Routine Release   • XR Foot 3+ View Left     Order Specific Question:   Reason for Exam:     Answer:   WOUND TO PLANTAR  HEEL   R/O OSTEOMYELITIS  ROOM 15  WB     Order Specific Question:   Does this patient have a diabetic monitoring/medication delivering device on?     Answer:   No     Order Specific Question:   Release to patient     Answer:   Routine Release   • Ambulatory Referral to Pain Management     Referral Priority:   Routine     Referral Type:   Pain Management     Referral Reason:   Specialty Services Required     Referral Location:   [facility]     Requested Specialty:   Pain Medicine     Number of Visits Requested:   1   • Ambulatory Referral to Home Health (Outpatient)     Referral Priority:   Routine     Referral Type:   Home Health     Referral Reason:   Specialty Services Required     Requested Specialty:   Home Health Services     Number of Visits Requested:   999        Note is dictated utilizing voice recognition software. Unfortunately this leads to occasional typographical errors. I apologize in advance if the situation occurs. If questions occur please do not hesitate to call our office.

## 2022-09-29 ENCOUNTER — HOME HEALTH ADMISSION (OUTPATIENT)
Dept: HOME HEALTH SERVICES | Facility: HOME HEALTHCARE | Age: 67
End: 2022-09-29

## 2022-09-29 ENCOUNTER — TELEPHONE (OUTPATIENT)
Dept: PODIATRY | Facility: CLINIC | Age: 67
End: 2022-09-29

## 2022-09-29 NOTE — TELEPHONE ENCOUNTER
REFERRAL WAS SENT TO [facility] YESTERDAY. I AM TRYING TO GET A HOLD OF PATIENT TO EXPLAIN AGAIN THAT HOME HEALTH WILL HELP HER BUT SHE DID NOT ANSWER AND VOICEMAIL FULL.  I WILL KEEP TRYING

## 2022-09-29 NOTE — TELEPHONE ENCOUNTER
Caller: MANDIE TILLEY  Relationship to Patient: SELF    Phone Number: [phone]  Reason for Call: CARE FOR HERSELF  When was the patient last seen: 09/28/22    PATIENT IS WANTING TO SEE IF SHE CAN GET PUT INTO SOME KIND OF WOUND CARE UNIT SINCE SHE IS NOT SUPPOSE TO WALK ON HER FOOT. SHE IS NOT ABLE TO CARE FOR HERSELF IF SHE CANNOT USE 1 FOOT. PLEASE CALL TO ASSIST, THANK YOU!

## 2022-12-13 ENCOUNTER — INPATIENT HOSPITAL (INPATIENT)
Dept: URBAN - METROPOLITAN AREA HOSPITAL 76 | Facility: HOSPITAL | Age: 67
End: 2022-12-13
Payer: MEDICARE

## 2022-12-13 DIAGNOSIS — R11.2 NAUSEA WITH VOMITING, UNSPECIFIED: ICD-10-CM

## 2022-12-13 PROCEDURE — 99222 1ST HOSP IP/OBS MODERATE 55: CPT | Performed by: NURSE PRACTITIONER

## 2022-12-14 ENCOUNTER — INPATIENT HOSPITAL (INPATIENT)
Dept: URBAN - METROPOLITAN AREA HOSPITAL 76 | Facility: HOSPITAL | Age: 67
End: 2022-12-14
Payer: MEDICARE

## 2022-12-14 DIAGNOSIS — R11.0 NAUSEA: ICD-10-CM

## 2022-12-14 PROCEDURE — 99232 SBSQ HOSP IP/OBS MODERATE 35: CPT | Performed by: NURSE PRACTITIONER

## 2022-12-15 ENCOUNTER — INPATIENT HOSPITAL (INPATIENT)
Dept: URBAN - METROPOLITAN AREA HOSPITAL 76 | Facility: HOSPITAL | Age: 67
End: 2022-12-15
Payer: MEDICARE

## 2022-12-15 DIAGNOSIS — R11.0 NAUSEA: ICD-10-CM

## 2022-12-15 DIAGNOSIS — K29.70 GASTRITIS, UNSPECIFIED, WITHOUT BLEEDING: ICD-10-CM

## 2022-12-15 DIAGNOSIS — K31.7 POLYP OF STOMACH AND DUODENUM: ICD-10-CM

## 2022-12-15 PROCEDURE — 43239 EGD BIOPSY SINGLE/MULTIPLE: CPT | Performed by: INTERNAL MEDICINE

## 2022-12-16 ENCOUNTER — INPATIENT HOSPITAL (INPATIENT)
Dept: URBAN - METROPOLITAN AREA HOSPITAL 76 | Facility: HOSPITAL | Age: 67
End: 2022-12-16
Payer: MEDICARE

## 2022-12-16 DIAGNOSIS — K76.9 LIVER DISEASE, UNSPECIFIED: ICD-10-CM

## 2022-12-16 PROCEDURE — 99232 SBSQ HOSP IP/OBS MODERATE 35: CPT | Performed by: NURSE PRACTITIONER
